# Patient Record
Sex: MALE | Race: WHITE | NOT HISPANIC OR LATINO | URBAN - METROPOLITAN AREA
[De-identification: names, ages, dates, MRNs, and addresses within clinical notes are randomized per-mention and may not be internally consistent; named-entity substitution may affect disease eponyms.]

---

## 2017-05-30 ENCOUNTER — OUTPATIENT (OUTPATIENT)
Dept: OUTPATIENT SERVICES | Facility: HOSPITAL | Age: 57
LOS: 1 days | End: 2017-05-30
Payer: COMMERCIAL

## 2017-05-30 VITALS
HEART RATE: 80 BPM | HEIGHT: 68 IN | RESPIRATION RATE: 16 BRPM | OXYGEN SATURATION: 100 % | DIASTOLIC BLOOD PRESSURE: 72 MMHG | TEMPERATURE: 98 F | SYSTOLIC BLOOD PRESSURE: 136 MMHG | WEIGHT: 151.02 LBS

## 2017-05-30 DIAGNOSIS — Z98.890 OTHER SPECIFIED POSTPROCEDURAL STATES: Chronic | ICD-10-CM

## 2017-05-30 DIAGNOSIS — R22.2 LOCALIZED SWELLING, MASS AND LUMP, TRUNK: ICD-10-CM

## 2017-05-30 DIAGNOSIS — D17.1 BENIGN LIPOMATOUS NEOPLASM OF SKIN AND SUBCUTANEOUS TISSUE OF TRUNK: ICD-10-CM

## 2017-05-30 DIAGNOSIS — I10 ESSENTIAL (PRIMARY) HYPERTENSION: ICD-10-CM

## 2017-05-30 DIAGNOSIS — Z01.818 ENCOUNTER FOR OTHER PREPROCEDURAL EXAMINATION: ICD-10-CM

## 2017-05-30 PROCEDURE — G0463: CPT

## 2017-05-30 RX ORDER — SODIUM CHLORIDE 9 MG/ML
3 INJECTION INTRAMUSCULAR; INTRAVENOUS; SUBCUTANEOUS EVERY 8 HOURS
Qty: 0 | Refills: 0 | Status: DISCONTINUED | OUTPATIENT
Start: 2017-06-06 | End: 2017-06-21

## 2017-05-30 RX ORDER — CELECOXIB 200 MG/1
200 CAPSULE ORAL ONCE
Qty: 0 | Refills: 0 | Status: COMPLETED | OUTPATIENT
Start: 2017-06-06 | End: 2017-06-06

## 2017-05-30 RX ORDER — ACETAMINOPHEN 500 MG
975 TABLET ORAL ONCE
Qty: 0 | Refills: 0 | Status: COMPLETED | OUTPATIENT
Start: 2017-06-06 | End: 2017-06-06

## 2017-05-30 RX ORDER — LIDOCAINE HCL 20 MG/ML
0.2 VIAL (ML) INJECTION ONCE
Qty: 0 | Refills: 0 | Status: DISCONTINUED | OUTPATIENT
Start: 2017-06-06 | End: 2017-06-21

## 2017-05-30 NOTE — H&P PST ADULT - HISTORY OF PRESENT ILLNESS
56 year old male  with myocutaneous lump anterior abdominal wall, scheduled for removal on 06/06/17.

## 2017-05-30 NOTE — H&P PST ADULT - NSANTHOSAYNRD_GEN_A_CORE
No. MIGUEL screening performed.  STOP BANG Legend: 0-2 = LOW Risk; 3-4 = INTERMEDIATE Risk; 5-8 = HIGH Risk

## 2017-06-05 ENCOUNTER — TRANSCRIPTION ENCOUNTER (OUTPATIENT)
Age: 57
End: 2017-06-05

## 2017-06-06 ENCOUNTER — OUTPATIENT (OUTPATIENT)
Dept: OUTPATIENT SERVICES | Facility: HOSPITAL | Age: 57
LOS: 1 days | End: 2017-06-06
Payer: COMMERCIAL

## 2017-06-06 VITALS
OXYGEN SATURATION: 97 % | HEART RATE: 99 BPM | DIASTOLIC BLOOD PRESSURE: 84 MMHG | SYSTOLIC BLOOD PRESSURE: 133 MMHG | RESPIRATION RATE: 16 BRPM

## 2017-06-06 VITALS
OXYGEN SATURATION: 98 % | WEIGHT: 151.02 LBS | SYSTOLIC BLOOD PRESSURE: 150 MMHG | DIASTOLIC BLOOD PRESSURE: 87 MMHG | HEIGHT: 68 IN | HEART RATE: 92 BPM | TEMPERATURE: 99 F | RESPIRATION RATE: 16 BRPM

## 2017-06-06 DIAGNOSIS — R22.2 LOCALIZED SWELLING, MASS AND LUMP, TRUNK: ICD-10-CM

## 2017-06-06 DIAGNOSIS — Z98.890 OTHER SPECIFIED POSTPROCEDURAL STATES: Chronic | ICD-10-CM

## 2017-06-06 PROCEDURE — 15600 DELAY FLAP TRUNK: CPT

## 2017-06-06 PROCEDURE — 88305 TISSUE EXAM BY PATHOLOGIST: CPT | Mod: 26

## 2017-06-06 PROCEDURE — 88305 TISSUE EXAM BY PATHOLOGIST: CPT

## 2017-06-06 PROCEDURE — 22901 EXC ABDL TUM DEEP 5 CM/>: CPT

## 2017-06-06 PROCEDURE — 15734 MUSCLE-SKIN GRAFT TRUNK: CPT

## 2017-06-06 RX ORDER — SODIUM CHLORIDE 9 MG/ML
1000 INJECTION, SOLUTION INTRAVENOUS
Qty: 0 | Refills: 0 | Status: DISCONTINUED | OUTPATIENT
Start: 2017-06-06 | End: 2017-06-21

## 2017-06-06 RX ORDER — CELECOXIB 200 MG/1
200 CAPSULE ORAL ONCE
Qty: 0 | Refills: 0 | Status: DISCONTINUED | OUTPATIENT
Start: 2017-06-06 | End: 2017-06-21

## 2017-06-06 RX ORDER — OXYCODONE HYDROCHLORIDE 5 MG/1
10 TABLET ORAL ONCE
Qty: 0 | Refills: 0 | Status: DISCONTINUED | OUTPATIENT
Start: 2017-06-06 | End: 2017-06-06

## 2017-06-06 RX ORDER — FAMOTIDINE 10 MG/ML
1 INJECTION INTRAVENOUS
Qty: 0 | Refills: 0 | COMMUNITY

## 2017-06-06 RX ORDER — ONDANSETRON 8 MG/1
4 TABLET, FILM COATED ORAL ONCE
Qty: 0 | Refills: 0 | Status: DISCONTINUED | OUTPATIENT
Start: 2017-06-06 | End: 2017-06-21

## 2017-06-06 RX ADMIN — Medication 975 MILLIGRAM(S): at 07:59

## 2017-06-06 RX ADMIN — CELECOXIB 200 MILLIGRAM(S): 200 CAPSULE ORAL at 07:59

## 2017-06-06 NOTE — ASU DISCHARGE PLAN (ADULT/PEDIATRIC). - MEDICATION SUMMARY - MEDICATIONS TO TAKE
I will START or STAY ON the medications listed below when I get home from the hospital:    Caduet 10 mg-20 mg oral tablet  --  by mouth   -- Indication: For hypertention

## 2017-06-06 NOTE — ASU DISCHARGE PLAN (ADULT/PEDIATRIC). - NOTIFY
Fever greater than 101/Pain not relieved by Medications/Unable to Urinate/Persistent Nausea and Vomiting/Swelling that continues/Inability to Tolerate Liquids or Foods/Increased Irritability or Sluggishness/Bleeding that does not stop

## 2017-06-09 LAB — SURGICAL PATHOLOGY STUDY: SIGNIFICANT CHANGE UP

## 2023-10-23 PROBLEM — E78.5 HYPERLIPIDEMIA, UNSPECIFIED: Chronic | Status: ACTIVE | Noted: 2017-05-30

## 2023-10-23 PROBLEM — D17.1 BENIGN LIPOMATOUS NEOPLASM OF SKIN AND SUBCUTANEOUS TISSUE OF TRUNK: Chronic | Status: ACTIVE | Noted: 2017-05-30

## 2023-10-23 PROBLEM — I10 ESSENTIAL (PRIMARY) HYPERTENSION: Chronic | Status: ACTIVE | Noted: 2017-05-30

## 2023-11-21 PROBLEM — Z00.00 ENCOUNTER FOR PREVENTIVE HEALTH EXAMINATION: Status: ACTIVE | Noted: 2023-11-21

## 2023-11-22 DIAGNOSIS — M25.561 PAIN IN RIGHT KNEE: ICD-10-CM

## 2023-11-27 ENCOUNTER — APPOINTMENT (OUTPATIENT)
Dept: ORTHOPEDIC SURGERY | Facility: CLINIC | Age: 63
End: 2023-11-27
Payer: COMMERCIAL

## 2023-11-27 VITALS — BODY MASS INDEX: 23.04 KG/M2 | HEIGHT: 68 IN | WEIGHT: 152 LBS

## 2023-11-27 PROCEDURE — 73564 X-RAY EXAM KNEE 4 OR MORE: CPT | Mod: RT

## 2023-11-27 PROCEDURE — 99203 OFFICE O/P NEW LOW 30 MIN: CPT

## 2024-02-23 ENCOUNTER — APPOINTMENT (OUTPATIENT)
Dept: ORTHOPEDIC SURGERY | Facility: CLINIC | Age: 64
End: 2024-02-23
Payer: COMMERCIAL

## 2024-02-23 VITALS — WEIGHT: 150 LBS | BODY MASS INDEX: 22.73 KG/M2 | HEIGHT: 68 IN

## 2024-02-23 PROCEDURE — 99213 OFFICE O/P EST LOW 20 MIN: CPT

## 2024-02-23 PROCEDURE — 73564 X-RAY EXAM KNEE 4 OR MORE: CPT | Mod: RT

## 2024-02-23 NOTE — DISCUSSION/SUMMARY
[de-identified] : Discussed at length the nature of the patients condition. Their right knee symptoms appear secondary to lateral hamstring strain and mild arthritis.   Patient can continue home exercise, weight management and activities as tolerated. All questions were answered, understanding verbalized. Patient is in agreement with plan of treatment. Patient may follow up in 3 months if still having symptoms.

## 2024-02-23 NOTE — HISTORY OF PRESENT ILLNESS
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 During his last visit, he was recommended to go to physical therapy which he stated helped especially with the pain he was experiencing while navigating the stairs. About 5 weeks ago, he went bowling and feels he pulled something at the posterior aspect. He notices clicking.

## 2024-02-23 NOTE — ADDENDUM
[FreeTextEntry1] : This note was written by Juan Mccabe on 02/23/2024 acting as scribe for Dr. Inder Gann M.D.  I, Dr. Inder Gann, have read and attest that all the information, medical decision making and discharge instructions within are true and accurate.

## 2024-02-23 NOTE — CONSULT LETTER
[Dear  ___] : Dear  [unfilled], [Please see my note below.] : Please see my note below. [Consult Letter:] : I had the pleasure of evaluating your patient, [unfilled]. [Consult Closing:] : Thank you very much for allowing me to participate in the care of this patient.  If you have any questions, please do not hesitate to contact me. [FreeTextEntry2] : MP BUTT [Sincerely,] : Sincerely,

## 2024-02-23 NOTE — PHYSICAL EXAM
[de-identified] : General appearance: well-nourished and hydrated, pleasant, alert and oriented x 3, cooperative. HEENT: Normocephalic, EOM intact, Nasal septum midline, Oral cavity clear, External auditory canal clear. Cardiovascular: no apparent abnormalities, lower leg edema, no varicosities, pedal pulses are palpable. Lymphatics Lymph nodes: none palpated, Lymphedema: not present. Neurologic: sensation is normal, no muscle weakness in upper or lower extremities, patella tendon reflexes intact. Dermatologic no apparent skin lesions, moist, warm, no rash. Spine: cervical spine appears normal and moves freely, thoracic spine appears normal and moves freely, lumbosacral spine appears normal and moves freely. Gait: nonantalgic.  Right knee Inspection: trace effusion, no erythema.  Wounds: none. Alignment: normal. Palpation: tenderness over lateral hamstring tendon posterior lateral corner ROM active (in degrees): 0-135 Ligamentous laxity: all ligaments appear stable, negative ant. drawer test, negative post. drawer test, stable to varus stress test, stable to valgus stress test. negative Lachman's test, negative pivot shift test Meniscal Test: negative McMurrays, negative Shahriar. Patellofemoral Alignment Test: Q angle-, normal. Muscle Test: good quad strength. Leg examination: calf is soft and non-tender.   Left hip Inspection: No swelling or ecchymosis. Wounds: none. Palpation: non-tender. Stability: no instability. Strength: 5/5 all motor groups. ROM: no pain with FROM. Leg length: equal.   Right hip Inspection: No swelling or ecchymosis. Wounds: none. Palpation: non-tender. Stability: no instability. Strength: 5/5 all motor groups. ROM: no pain with FROM. Leg length: equal.   Left ankle Inspection: no erythema noted, no swelling noted. Palpation: no pain on palpation. ROM: FROM without crepitus. Muscle strength: 5/5. Stability: no instability noted.   Right ankle Inspection: no erythema noted, no swelling noted. ROM: FROM without crepitus. Palpation: no pain on palpation. Muscle strength: 5/5. Stability: no instability noted.   Left foot Inspection: color, texture and turgor are normal. ROM: full range of motion of all joints without pain or crepitus. Palpation: no tenderness. Stability: no instability noted.   Right foot Inspection: color, texture and turgor are normal. ROM: full range of motion of all joints without pain or crepitus. Palpation: no tenderness. Stability: no instability noted.   Left shoulder Inspection: no muscle asymmetry, no atrophy. Palpation: no tenderness noted, ACJ non-tender. ROM: full active ROM, full passive ROM. Strength testing): anterior deltoid, supraspinatus, infraspinatus, subscapularis all 5/5. Stability test: ant. apprehension negative, post. apprehension negative, relocation test negative. Impingement Test: negative NEER.   Right shoulder Inspection: no muscle asymmetry, no atrophy. Palpation: no tenderness noted, ACJ non-tender. ROM: full active ROM, full passive ROM. Strength testing): anterior deltoid, supraspinatus, infraspinatus, subscapularis all 5/5. Stability test: ant. apprehension negative, post. apprehension negative, relocation test negative. Impingement Test: negative NEER. Surgical Wounds: none.   Left elbow Inspection: negative swelling. Wounds: none. Palpation: non-tender. ROM: full ROM. Strength: 5/5 all groups. Stability: no instability. Mass: none.   Right elbow Inspection: negative swelling. Wounds: none. Palpation: non-tender. ROM: full ROM. Strength: 5/5 all groups. Stability: no instability. Mass: none.   Left wrist Inspection: negative swelling. Wound: none. Palpation (bone): no tenderness. ROM: full ROM. Strength: full , good.   Right wrist Inspection: negative swelling. Wound: none. Palpation (bone): no tenderness. ROM: full ROM. Strength: full , good.   Left hand Inspection: no skin changes, normal appearance. Wounds: none. Strength: full , able to make full fist. Sensation: light touch intact all fingers and thumb. Vascular: good capillary refill < 3 seconds, all fingers and thumb. Mass: none.   Right hand Inspection: no skin changes, normal appearance. Wounds: none. Strength: full , able to make full fist. Sensation: light touch intact all fingers and thumb. Vascular: good capillary refill < 3 seconds, all fingers and thumb. Mass: none. [de-identified] : Right knee x-rays, standing AP/Lateral and Merchant films, and 45-degree PA standing view, taken at the office today shows degenerative arthritis, normal alignment, slight decrease in medial joint space, patella at an appropriate height and central position, patellofemoral joint space maintained, Kellgren and Edgar grade 1.  Left knee x-ray merchant view taken at the office today demonstrates good joint space and a well centered patella.

## 2024-03-25 NOTE — ASU PATIENT PROFILE, ADULT - ATTEMPT TO OOB
Pt sitting up in bed, talking on phone. Pt A&Ox4 in NAD @ this time. RR even & unlabored. Pt NSR on cardiac monitor. Pt awaiting Echo & stress test. Pt denies any needs @ this time. Safety maintained.     Pt taken to Echo. no

## 2024-05-31 ENCOUNTER — APPOINTMENT (OUTPATIENT)
Dept: ORTHOPEDIC SURGERY | Facility: CLINIC | Age: 64
End: 2024-05-31
Payer: COMMERCIAL

## 2024-05-31 VITALS — BODY MASS INDEX: 22.73 KG/M2 | WEIGHT: 150 LBS | HEIGHT: 68 IN

## 2024-05-31 DIAGNOSIS — M17.11 UNILATERAL PRIMARY OSTEOARTHRITIS, RIGHT KNEE: ICD-10-CM

## 2024-05-31 DIAGNOSIS — S76.311A STRAIN OF MUSCLE, FASCIA AND TENDON OF THE POSTERIOR MUSCLE GROUP AT THIGH LEVEL, RIGHT THIGH, INITIAL ENCOUNTER: ICD-10-CM

## 2024-05-31 PROCEDURE — 73564 X-RAY EXAM KNEE 4 OR MORE: CPT | Mod: RT

## 2024-05-31 PROCEDURE — 99213 OFFICE O/P EST LOW 20 MIN: CPT

## 2024-05-31 NOTE — PHYSICAL EXAM
[de-identified] : General appearance: well-nourished and hydrated, pleasant, alert and oriented x 3, cooperative. HEENT: Normocephalic, EOM intact, Nasal septum midline, Oral cavity clear, External auditory canal clear. Cardiovascular: no apparent abnormalities, lower leg edema, no varicosities, pedal pulses are palpable. Lymphatics Lymph nodes: none palpated, Lymphedema: not present. Neurologic: sensation is normal, no muscle weakness in upper or lower extremities, patella tendon reflexes intact. Dermatologic no apparent skin lesions, moist, warm, no rash. Spine: cervical spine appears normal and moves freely, thoracic spine appears normal and moves freely, lumbosacral spine appears normal and moves freely. Gait: nonantalgic.  Right knee Inspection: trace effusion, no erythema.  Wounds: none. Alignment: normal. Palpation: tenderness over lateral hamstring tendon posterior lateral corner ROM active (in degrees): 0-135 Ligamentous laxity: all ligaments appear stable, negative ant. drawer test, negative post. drawer test, stable to varus stress test, stable to valgus stress test. negative Lachman's test, negative pivot shift test Meniscal Test: negative McMurrays, negative Shahriar. Patellofemoral Alignment Test: Q angle-, normal. Muscle Test: good quad strength. Leg examination: calf is soft and non-tender.   Left hip Inspection: No swelling or ecchymosis. Wounds: none. Palpation: non-tender. Stability: no instability. Strength: 5/5 all motor groups. ROM: no pain with FROM. Leg length: equal.   Right hip Inspection: No swelling or ecchymosis. Wounds: none. Palpation: non-tender. Stability: no instability. Strength: 5/5 all motor groups. ROM: no pain with FROM. Leg length: equal.   Left ankle Inspection: no erythema noted, no swelling noted. Palpation: no pain on palpation. ROM: FROM without crepitus. Muscle strength: 5/5. Stability: no instability noted.   Right ankle Inspection: no erythema noted, no swelling noted. ROM: FROM without crepitus. Palpation: no pain on palpation. Muscle strength: 5/5. Stability: no instability noted.   Left foot Inspection: color, texture and turgor are normal. ROM: full range of motion of all joints without pain or crepitus. Palpation: no tenderness. Stability: no instability noted.   Right foot Inspection: color, texture and turgor are normal. ROM: full range of motion of all joints without pain or crepitus. Palpation: no tenderness. Stability: no instability noted.   Left shoulder Inspection: no muscle asymmetry, no atrophy. Palpation: no tenderness noted, ACJ non-tender. ROM: full active ROM, full passive ROM. Strength testing): anterior deltoid, supraspinatus, infraspinatus, subscapularis all 5/5. Stability test: ant. apprehension negative, post. apprehension negative, relocation test negative. Impingement Test: negative NEER.   Right shoulder Inspection: no muscle asymmetry, no atrophy. Palpation: no tenderness noted, ACJ non-tender. ROM: full active ROM, full passive ROM. Strength testing): anterior deltoid, supraspinatus, infraspinatus, subscapularis all 5/5. Stability test: ant. apprehension negative, post. apprehension negative, relocation test negative. Impingement Test: negative NEER. Surgical Wounds: none.   Left elbow Inspection: negative swelling. Wounds: none. Palpation: non-tender. ROM: full ROM. Strength: 5/5 all groups. Stability: no instability. Mass: none.   Right elbow Inspection: negative swelling. Wounds: none. Palpation: non-tender. ROM: full ROM. Strength: 5/5 all groups. Stability: no instability. Mass: none.   Left wrist Inspection: negative swelling. Wound: none. Palpation (bone): no tenderness. ROM: full ROM. Strength: full , good.   Right wrist Inspection: negative swelling. Wound: none. Palpation (bone): no tenderness. ROM: full ROM. Strength: full , good.   Left hand Inspection: no skin changes, normal appearance. Wounds: none. Strength: full , able to make full fist. Sensation: light touch intact all fingers and thumb. Vascular: good capillary refill < 3 seconds, all fingers and thumb. Mass: none.   Right hand Inspection: no skin changes, normal appearance. Wounds: none. Strength: full , able to make full fist. Sensation: light touch intact all fingers and thumb. Vascular: good capillary refill < 3 seconds, all fingers and thumb. Mass: none. [de-identified] : Right knee x-rays, standing AP/Lateral and Merchant films, and 45-degree PA standing view, taken at the office today shows degenerative arthritis, normal alignment, slight decrease in medial joint space, patella at an appropriate height and central position, patellofemoral joint space maintained, Kellgren and Edgar grade 1.  Left knee x-ray merchant view taken at the office today demonstrates joint space maintained and a well centered patella.

## 2024-05-31 NOTE — CONSULT LETTER
[Dear  ___] : Dear  [unfilled], [Consult Letter:] : I had the pleasure of evaluating your patient, [unfilled]. [Please see my note below.] : Please see my note below. [Consult Closing:] : Thank you very much for allowing me to participate in the care of this patient.  If you have any questions, please do not hesitate to contact me. [Sincerely,] : Sincerely, [FreeTextEntry2] : MP BUTT

## 2024-05-31 NOTE — HISTORY OF PRESENT ILLNESS
[de-identified] : KANDI HAWKINS, 63 year old male, presents for evaluation of right knee symptoms of pain related to tight hamstrings. He states that he improved with physical therapy and is doing home exercise at this time with pain only on stairs; patient denies taking medications.

## 2024-05-31 NOTE — ADDENDUM
[FreeTextEntry1] : This note was written by Juan Mccabe on 05/31/2024 acting as scribe for Dr. Inder STONE I, Dr. Inder Gann, have read and attest that all the information, medical decision making and discharge instructions within are true and accurate.  This note was written by Clinton Sams on 05/31/2024 acting as a scribe for Dr. Inder STONE I, Dr. Inder Gann, have read and attest that all the information, medical decision making and discharge instructions within are true and accurate.